# Patient Record
Sex: FEMALE | Race: WHITE | NOT HISPANIC OR LATINO | URBAN - METROPOLITAN AREA
[De-identification: names, ages, dates, MRNs, and addresses within clinical notes are randomized per-mention and may not be internally consistent; named-entity substitution may affect disease eponyms.]

---

## 2022-12-20 ENCOUNTER — EMERGENCY (EMERGENCY)
Facility: HOSPITAL | Age: 52
LOS: 1 days | Discharge: HOME | End: 2022-12-20
Attending: EMERGENCY MEDICINE
Payer: COMMERCIAL

## 2022-12-20 VITALS
WEIGHT: 154.98 LBS | HEART RATE: 65 BPM | HEIGHT: 66 IN | RESPIRATION RATE: 16 BRPM | SYSTOLIC BLOOD PRESSURE: 148 MMHG | DIASTOLIC BLOOD PRESSURE: 90 MMHG | OXYGEN SATURATION: 100 % | TEMPERATURE: 97 F

## 2022-12-20 LAB
ALBUMIN SERPL ELPH-MCNC: 4.2 G/DL — SIGNIFICANT CHANGE UP (ref 3.3–5)
ALP SERPL-CCNC: 60 U/L — SIGNIFICANT CHANGE UP (ref 40–120)
ALT FLD-CCNC: 23 U/L — SIGNIFICANT CHANGE UP (ref 10–45)
ANION GAP SERPL CALC-SCNC: 13 MMOL/L — SIGNIFICANT CHANGE UP (ref 5–17)
APTT BLD: 33.2 SEC — SIGNIFICANT CHANGE UP (ref 27.5–35.5)
AST SERPL-CCNC: 14 U/L — SIGNIFICANT CHANGE UP (ref 10–40)
BASOPHILS # BLD AUTO: 0.03 K/UL — SIGNIFICANT CHANGE UP (ref 0–0.2)
BASOPHILS NFR BLD AUTO: 0.4 % — SIGNIFICANT CHANGE UP (ref 0–2)
BILIRUB SERPL-MCNC: 0.2 MG/DL — SIGNIFICANT CHANGE UP (ref 0.2–1.2)
BUN SERPL-MCNC: 7 MG/DL — SIGNIFICANT CHANGE UP (ref 7–23)
CALCIUM SERPL-MCNC: 9.3 MG/DL — SIGNIFICANT CHANGE UP (ref 8.4–10.5)
CHLORIDE SERPL-SCNC: 105 MMOL/L — SIGNIFICANT CHANGE UP (ref 96–108)
CO2 SERPL-SCNC: 23 MMOL/L — SIGNIFICANT CHANGE UP (ref 22–31)
CREAT SERPL-MCNC: 0.63 MG/DL — SIGNIFICANT CHANGE UP (ref 0.5–1.3)
EGFR: 106 ML/MIN/1.73M2 — SIGNIFICANT CHANGE UP
EOSINOPHIL # BLD AUTO: 0.02 K/UL — SIGNIFICANT CHANGE UP (ref 0–0.5)
EOSINOPHIL NFR BLD AUTO: 0.3 % — SIGNIFICANT CHANGE UP (ref 0–6)
GLUCOSE SERPL-MCNC: 94 MG/DL — SIGNIFICANT CHANGE UP (ref 70–99)
HCG SERPL-ACNC: <2 MIU/ML — SIGNIFICANT CHANGE UP
HCT VFR BLD CALC: 44.8 % — SIGNIFICANT CHANGE UP (ref 34.5–45)
HGB BLD-MCNC: 14.5 G/DL — SIGNIFICANT CHANGE UP (ref 11.5–15.5)
IMM GRANULOCYTES NFR BLD AUTO: 0.4 % — SIGNIFICANT CHANGE UP (ref 0–0.9)
INR BLD: 0.96 RATIO — SIGNIFICANT CHANGE UP (ref 0.88–1.16)
LACTATE SERPL-SCNC: 2 MMOL/L — SIGNIFICANT CHANGE UP (ref 0.5–2)
LIDOCAIN IGE QN: 37 U/L — SIGNIFICANT CHANGE UP (ref 7–60)
LYMPHOCYTES # BLD AUTO: 1.96 K/UL — SIGNIFICANT CHANGE UP (ref 1–3.3)
LYMPHOCYTES # BLD AUTO: 26.3 % — SIGNIFICANT CHANGE UP (ref 13–44)
MCHC RBC-ENTMCNC: 30.9 PG — SIGNIFICANT CHANGE UP (ref 27–34)
MCHC RBC-ENTMCNC: 32.4 GM/DL — SIGNIFICANT CHANGE UP (ref 32–36)
MCV RBC AUTO: 95.3 FL — SIGNIFICANT CHANGE UP (ref 80–100)
MONOCYTES # BLD AUTO: 0.63 K/UL — SIGNIFICANT CHANGE UP (ref 0–0.9)
MONOCYTES NFR BLD AUTO: 8.4 % — SIGNIFICANT CHANGE UP (ref 2–14)
NEUTROPHILS # BLD AUTO: 4.79 K/UL — SIGNIFICANT CHANGE UP (ref 1.8–7.4)
NEUTROPHILS NFR BLD AUTO: 64.2 % — SIGNIFICANT CHANGE UP (ref 43–77)
NRBC # BLD: 0 /100 WBCS — SIGNIFICANT CHANGE UP (ref 0–0)
PLATELET # BLD AUTO: 258 K/UL — SIGNIFICANT CHANGE UP (ref 150–400)
POTASSIUM SERPL-MCNC: 4.3 MMOL/L — SIGNIFICANT CHANGE UP (ref 3.5–5.3)
POTASSIUM SERPL-SCNC: 4.3 MMOL/L — SIGNIFICANT CHANGE UP (ref 3.5–5.3)
PROT SERPL-MCNC: 7.1 G/DL — SIGNIFICANT CHANGE UP (ref 6–8.3)
PROTHROM AB SERPL-ACNC: 11 SEC — SIGNIFICANT CHANGE UP (ref 10.5–13.4)
RBC # BLD: 4.7 M/UL — SIGNIFICANT CHANGE UP (ref 3.8–5.2)
RBC # FLD: 12.6 % — SIGNIFICANT CHANGE UP (ref 10.3–14.5)
SODIUM SERPL-SCNC: 141 MMOL/L — SIGNIFICANT CHANGE UP (ref 135–145)
TROPONIN T, HIGH SENSITIVITY RESULT: 7 NG/L — SIGNIFICANT CHANGE UP (ref 0–51)
WBC # BLD: 7.46 K/UL — SIGNIFICANT CHANGE UP (ref 3.8–10.5)
WBC # FLD AUTO: 7.46 K/UL — SIGNIFICANT CHANGE UP (ref 3.8–10.5)

## 2022-12-20 PROCEDURE — 74177 CT ABD & PELVIS W/CONTRAST: CPT | Mod: 26,MA

## 2022-12-20 PROCEDURE — 72125 CT NECK SPINE W/O DYE: CPT | Mod: 26,MA

## 2022-12-20 PROCEDURE — G1004: CPT

## 2022-12-20 PROCEDURE — 72128 CT CHEST SPINE W/O DYE: CPT | Mod: 26,MG

## 2022-12-20 PROCEDURE — 99285 EMERGENCY DEPT VISIT HI MDM: CPT

## 2022-12-20 PROCEDURE — 70450 CT HEAD/BRAIN W/O DYE: CPT | Mod: 26,MA

## 2022-12-20 PROCEDURE — 71260 CT THORAX DX C+: CPT | Mod: 26,MA

## 2022-12-20 PROCEDURE — 93010 ELECTROCARDIOGRAM REPORT: CPT

## 2022-12-20 PROCEDURE — 72131 CT LUMBAR SPINE W/O DYE: CPT | Mod: 26,MG

## 2022-12-20 RX ORDER — SODIUM CHLORIDE 9 MG/ML
250 INJECTION INTRAMUSCULAR; INTRAVENOUS; SUBCUTANEOUS ONCE
Refills: 0 | Status: COMPLETED | OUTPATIENT
Start: 2022-12-20 | End: 2022-12-20

## 2022-12-20 RX ORDER — ACETAMINOPHEN 500 MG
650 TABLET ORAL ONCE
Refills: 0 | Status: COMPLETED | OUTPATIENT
Start: 2022-12-20 | End: 2022-12-20

## 2022-12-20 NOTE — ED PROVIDER NOTE - PATIENT PORTAL LINK FT
oral
You can access the FollowMyHealth Patient Portal offered by [facility] by registering at the following website: [email or web address]. By joining [facility]’s FollowMyHealth portal, you will also be able to view your health information using other applications (apps) compatible with our system.
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
## 2022-12-20 NOTE — ED PROVIDER NOTE - CARE PROVIDER_API CALL
Bannazadeh, Mohsen (MD)  Surgery; Vascular Surgery  [address] [place] [address]  Phone: [phone]  Fax: [phone]  Follow Up Time: 1-3 Days

## 2022-12-20 NOTE — ED PROVIDER NOTE - PROGRESS NOTE DETAILS
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 Follow-up imaging is recommended to assess for stability in the short term given recent trauma, and at 6 month interval" Additional incidental finding of Right thyroid indeterminate 1.5 cm nodule. Discussed results with patient. pt reports she is aware of thyroid nodule and is agreeable to f/up. Pending vascular consult regarding renal artery finding   Fiordaliza Mahoney PA-C Attending MD Blank: Patient re-evaluated and no acute issues at  this time.  Lab and radiology tests reviewed with patient and .  Seen by vascular, no acute vascular intervention.  Patient to follow with Dr. Belle for possible interval imaging.  Will discharge with C collar in place and with spine.  Patient stable for discharge. Follow up instructions given, importance of follow up emphasized, return to ED parameters reviewed and patient verbalized understanding.  All questions answered, all concerns addressed.

## 2022-12-20 NOTE — ED PROVIDER NOTE - PHYSICAL EXAMINATION
CONSTITUTIONAL: Patient is awake, alert and oriented x 3. Patient is well appearing and in no acute distress  HEAD: NCAT  EYES: PERRL b/l, EOMI  ENT: Airway patent, Nasal mucosa clear. Mouth with normal mucosa. Throat has no vesicles, no oropharyngeal exudates and uvula is midline  NECK: C-collar in place   LUNGS: CTA b/l, no wheezing or rales. + Anterior chest wall ttp    HEART: RRR.+S1S2 no murmurs  ABDOMEN: Soft, non-distended, nttp, no rebound or guarding. No seatbelt sign   EXTREMITY: no edema or calf tenderness b/l, FROM upper and lower ext b/l. + Cervical, thoracic and lumbar ttp  SKIN: with no rash or lesions  NEURO: Cn3-12 grossly intact. Strength 5/5 UE/LE b/l .Nml gross sensation UE/LE b/l. Gait normal. No pronator drift. Neg Romberg. Normal finger to nose CONSTITUTIONAL: Patient is awake, alert and oriented x 3. Patient is well appearing and in no acute distress  HEAD: NCAT  EYES: PERRL b/l, EOMI  ENT: Airway patent, Nasal mucosa clear. Mouth with normal mucosa. Throat has no vesicles, no oropharyngeal exudates and uvula is midline  NECK: C-collar in place   LUNGS: CTA b/l, no wheezing or rales. + Anterior chest wall ttp    HEART: RRR.+S1S2 no murmurs  ABDOMEN: Soft, non-distended, nttp, no rebound or guarding. No seatbelt sign   EXTREMITY: no edema or calf tenderness b/l, FROM upper and lower ext b/l. + Cervical, thoracic and lumbar ttp  SKIN: with no rash or lesions  NEURO: Cn3-12 grossly intact. Strength 5/5 UE/LE b/l .Nml gross sensation UE/LE b/l
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
## 2022-12-20 NOTE — ED PROVIDER NOTE - NSFOLLOWUPINSTRUCTIONS_ED_ALL_ED_FT
1. Please follow up with your Primary Care Doctor after discharge, bring a copy of your results to follow up appointment     ** Please discuss incidental finding of a thyroid nodule on your CT scan with recommendation for Thyroid US for further evaluation **    2. Please rest, stay hydrated. For continued or recurrent pain recommend taking over the counter Tylenol (acetaminophen) 1000mg every 6-8 hours as needed and/or over the counter Motrin (Ibuprofen/Advil) 400-600mg every 6 hours as needed    3. Follow up with Vascular Surgery after discharge for reevaluation and continued management. Please see office information below to call and schedule appointment:       Vascular Surgery    [address] [place] [address]    Phone: [phone]    Fax: [phone]    4. Follow up with Neurosurgery-Spine after discharge for reevaluation and continued management for continued back or neck pain. Please see office information below to call and schedule appointment:       Neurosurgery    [address], [address]    [place] [address]    Phone: [phone]    Fax: [phone]    5. Return to ED for any new or worsened symptoms of concern 1. Please follow up with your Primary Care Doctor after discharge, bring a copy of your results to follow up appointment     ** Please discuss incidental finding of a thyroid nodule on your CT scan with recommendation for Thyroid US for further evaluation **    2. Please rest, stay hydrated. For continued or recurrent pain recommend taking over the counter Tylenol (acetaminophen) 1000mg every 6-8 hours as needed and/or over the counter Motrin (Ibuprofen/Advil) 400-600mg every 6 hours as needed    3. Follow up with Vascular Surgery after discharge for reevaluation and continued management. Please see Dr. Belle's information below to call and schedule appointment:       Vascular Surgery    [address] [place] [address]    Phone: [phone]    Fax: [phone]    4. Follow up with Neurosurgery-Spine after discharge for reevaluation and continued management for continued back or neck pain. Please see office information below to call and schedule appointment:       Neurosurgery    [address] [facility], [address]    [place] [address]    Phone: [phone]    Fax: [phone]    PLEASE KEEP YOUR CERVICAL COLLAR ON UNTIL YOU ARE CLEARED BY THE SPINE TEAM.    5. Return to ED for any new or worsened symptoms of concern including numbness, weakness or tingling in extremities, loss of urinary or bowel continence, change in vision, double vision, sudden loss of vision, severe headache, chest pain, shortness of breath, severe abdominal pain, persistent vomiting, difficulty urinating or any other concerns.

## 2022-12-21 VITALS
OXYGEN SATURATION: 98 % | TEMPERATURE: 98 F | HEART RATE: 56 BPM | DIASTOLIC BLOOD PRESSURE: 76 MMHG | RESPIRATION RATE: 15 BRPM | SYSTOLIC BLOOD PRESSURE: 108 MMHG

## 2022-12-21 PROBLEM — Z00.00 ENCOUNTER FOR PREVENTIVE HEALTH EXAMINATION: Status: ACTIVE | Noted: 2022-12-21

## 2022-12-21 PROCEDURE — 80053 COMPREHEN METABOLIC PANEL: CPT

## 2022-12-21 PROCEDURE — 84484 ASSAY OF TROPONIN QUANT: CPT

## 2022-12-21 PROCEDURE — 70450 CT HEAD/BRAIN W/O DYE: CPT | Mod: MA

## 2022-12-21 PROCEDURE — 36415 COLL VENOUS BLD VENIPUNCTURE: CPT

## 2022-12-21 PROCEDURE — 93005 ELECTROCARDIOGRAM TRACING: CPT

## 2022-12-21 PROCEDURE — 85730 THROMBOPLASTIN TIME PARTIAL: CPT

## 2022-12-21 PROCEDURE — 84702 CHORIONIC GONADOTROPIN TEST: CPT

## 2022-12-21 PROCEDURE — G1004: CPT

## 2022-12-21 PROCEDURE — 74177 CT ABD & PELVIS W/CONTRAST: CPT | Mod: MA

## 2022-12-21 PROCEDURE — 85025 COMPLETE CBC W/AUTO DIFF WBC: CPT

## 2022-12-21 PROCEDURE — 99285 EMERGENCY DEPT VISIT HI MDM: CPT | Mod: 25

## 2022-12-21 PROCEDURE — 72125 CT NECK SPINE W/O DYE: CPT | Mod: MA

## 2022-12-21 PROCEDURE — 83605 ASSAY OF LACTIC ACID: CPT

## 2022-12-21 PROCEDURE — 83690 ASSAY OF LIPASE: CPT

## 2022-12-21 PROCEDURE — 85610 PROTHROMBIN TIME: CPT

## 2022-12-21 PROCEDURE — 71260 CT THORAX DX C+: CPT | Mod: MA

## 2022-12-21 RX ADMIN — Medication 650 MILLIGRAM(S): at 00:44
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
## 2022-12-22 ENCOUNTER — NON-APPOINTMENT (OUTPATIENT)
Age: 52
End: 2022-12-22

## 2022-12-23 ENCOUNTER — APPOINTMENT (OUTPATIENT)
Dept: ORTHOPEDIC SURGERY | Facility: CLINIC | Age: 52
End: 2022-12-23

## 2022-12-23 ENCOUNTER — TRANSCRIPTION ENCOUNTER (OUTPATIENT)
Age: 52
End: 2022-12-23

## 2022-12-23 PROCEDURE — 99203 OFFICE O/P NEW LOW 30 MIN: CPT

## 2022-12-23 PROCEDURE — 99072 ADDL SUPL MATRL&STAF TM PHE: CPT

## 2022-12-23 NOTE — PHYSICAL EXAM
[Antalgic] : not antalgic [Ataxic] : not ataxic [de-identified] : She is in the cervical collar.  Full range of motion bilateral shoulders without evidence of impingement. No instability of bilateral upper extremities.  Cranial nerves II through XII grossly intact. Intact sensation bilateral upper extremities. 5/5 deltoids biceps triceps wrist extensors wrist flexors finger flexors and hand intrinsics. 1+ biceps triceps and brachioradialis reflexes. Negative Mcmullen's. 2+ radial pulse. Negative Tinel's over the cubital and carpal tunnel. No skin lesions on the right and left upper extremities. [de-identified] : Review of her CTs of her cervical, thoracic, lumbar spine does not reveal any obvious fractures or dislocations.
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
## 2023-01-04 PROBLEM — M54.12 CERVICAL RADICULOPATHY: Status: ACTIVE | Noted: 2022-12-23

## 2023-01-05 ENCOUNTER — NON-APPOINTMENT (OUTPATIENT)
Age: 53
End: 2023-01-05

## 2023-01-06 ENCOUNTER — NON-APPOINTMENT (OUTPATIENT)
Age: 53
End: 2023-01-06

## 2023-01-06 ENCOUNTER — APPOINTMENT (OUTPATIENT)
Dept: NEUROSURGERY | Facility: CLINIC | Age: 53
End: 2023-01-06
Payer: COMMERCIAL

## 2023-01-06 VITALS
DIASTOLIC BLOOD PRESSURE: 68 MMHG | SYSTOLIC BLOOD PRESSURE: 108 MMHG | OXYGEN SATURATION: 100 % | WEIGHT: 145 LBS | HEIGHT: 67 IN | TEMPERATURE: 97.3 F | BODY MASS INDEX: 22.76 KG/M2 | HEART RATE: 58 BPM

## 2023-01-06 DIAGNOSIS — M54.12 RADICULOPATHY, CERVICAL REGION: ICD-10-CM

## 2023-01-06 PROCEDURE — 99072 ADDL SUPL MATRL&STAF TM PHE: CPT

## 2023-01-06 PROCEDURE — 99204 OFFICE O/P NEW MOD 45 MIN: CPT

## 2023-01-06 RX ORDER — METHYLPREDNISOLONE 4 MG/1
4 TABLET ORAL
Qty: 1 | Refills: 0 | Status: DISCONTINUED | COMMUNITY
Start: 2022-12-23 | End: 2023-01-06

## 2023-01-11 ENCOUNTER — APPOINTMENT (OUTPATIENT)
Dept: MRI IMAGING | Facility: CLINIC | Age: 53
End: 2023-01-11
Payer: COMMERCIAL

## 2023-01-11 ENCOUNTER — OUTPATIENT (OUTPATIENT)
Dept: OUTPATIENT SERVICES | Facility: HOSPITAL | Age: 53
LOS: 1 days | End: 2023-01-11
Payer: COMMERCIAL

## 2023-01-11 DIAGNOSIS — Z00.00 ENCOUNTER FOR GENERAL ADULT MEDICAL EXAMINATION WITHOUT ABNORMAL FINDINGS: ICD-10-CM

## 2023-01-11 PROCEDURE — 72141 MRI NECK SPINE W/O DYE: CPT

## 2023-01-11 PROCEDURE — 72141 MRI NECK SPINE W/O DYE: CPT | Mod: 26

## 2023-01-12 ENCOUNTER — APPOINTMENT (OUTPATIENT)
Dept: VASCULAR SURGERY | Facility: CLINIC | Age: 53
End: 2023-01-12
Payer: COMMERCIAL

## 2023-01-12 VITALS
TEMPERATURE: 97.8 F | SYSTOLIC BLOOD PRESSURE: 109 MMHG | WEIGHT: 145 LBS | HEIGHT: 67 IN | HEART RATE: 59 BPM | BODY MASS INDEX: 22.76 KG/M2 | DIASTOLIC BLOOD PRESSURE: 74 MMHG

## 2023-01-12 PROCEDURE — 99213 OFFICE O/P EST LOW 20 MIN: CPT

## 2023-01-12 PROCEDURE — 99072 ADDL SUPL MATRL&STAF TM PHE: CPT

## 2023-01-12 NOTE — PHYSICAL EXAM
[de-identified] : Alert and oriented [de-identified] : C-collar in place, cranial nerves grossly intact [de-identified] : Clear on auscultation bilaterally [de-identified] : Regular rate and rhythm [FreeTextEntry1] : Palpable bilateral femoral and radial pulses\par Abdomen soft nontender not distended\par Bilateral lower extremity well perfused
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
## 2023-01-12 NOTE — ASSESSMENT
[FreeTextEntry1] : 52-year-old female with incidental finding of 1 cm left renal artery aneurysm\par \par Plan\par \par Follow-up in 6 months with renal artery duplex\par Discussed with the patient to stop smoking

## 2023-01-13 ENCOUNTER — RESULT REVIEW (OUTPATIENT)
Age: 53
End: 2023-01-13

## 2023-01-14 ENCOUNTER — APPOINTMENT (OUTPATIENT)
Dept: RADIOLOGY | Facility: IMAGING CENTER | Age: 53
End: 2023-01-14

## 2023-01-15 ENCOUNTER — APPOINTMENT (OUTPATIENT)
Dept: RADIOLOGY | Facility: IMAGING CENTER | Age: 53
End: 2023-01-15
Payer: COMMERCIAL

## 2023-01-15 ENCOUNTER — OUTPATIENT (OUTPATIENT)
Dept: OUTPATIENT SERVICES | Facility: HOSPITAL | Age: 53
LOS: 1 days | End: 2023-01-15
Payer: COMMERCIAL

## 2023-01-15 DIAGNOSIS — M54.12 RADICULOPATHY, CERVICAL REGION: ICD-10-CM

## 2023-01-15 PROCEDURE — 72052 X-RAY EXAM NECK SPINE 6/>VWS: CPT | Mod: 26

## 2023-01-15 PROCEDURE — 72052 X-RAY EXAM NECK SPINE 6/>VWS: CPT
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
## 2023-01-15 NOTE — PHYSICAL EXAM
[General Appearance - Alert] : alert [General Appearance - In No Acute Distress] : in no acute distress [General Appearance - Well Nourished] : well nourished [General Appearance - Well Developed] : well developed [Oriented To Time, Place, And Person] : oriented to person, place, and time [Impaired Insight] : insight and judgment were intact [Affect] : the affect was normal [Motor Tone] : muscle tone was normal in all four extremities [Motor Strength] : muscle strength was normal in all four extremities [Sensation Tactile Decrease] : light touch was intact [Balance] : balance was intact [2+] : Brachioradialis left 2+ [1+] : Patella left 1+ [Sclera] : the sclera and conjunctiva were normal [PERRL With Normal Accommodation] : pupils were equal in size, round, reactive to light, with normal accommodation [Extraocular Movements] : extraocular movements were intact [Full Visual Field] : full visual field [Outer Ear] : the ears and nose were normal in appearance [Neck Appearance] : the appearance of the neck was normal [Neck Cervical Mass (___cm)] : no neck mass was observed [] : no respiratory distress [Exaggerated Use Of Accessory Muscles For Inspiration] : no accessory muscle use [Heart Rate And Rhythm] : heart rate was normal and rhythm regular [Edema] : there was no peripheral edema [Abdomen Soft] : soft [No Spinal Tenderness] : no spinal tenderness [Abnormal Walk] : normal gait [Involuntary Movements] : no involuntary movements were seen [Skin Turgor] : normal skin turgor [FreeTextEntry1] : C-collar in place; with neck pain with nec flextion [Limited Balance] : balance was intact [Tremor] : no tremor present
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
## 2023-01-25 ENCOUNTER — APPOINTMENT (OUTPATIENT)
Dept: ORTHOPEDIC SURGERY | Facility: CLINIC | Age: 53
End: 2023-01-25

## 2023-07-13 ENCOUNTER — APPOINTMENT (OUTPATIENT)
Dept: VASCULAR SURGERY | Facility: CLINIC | Age: 53
End: 2023-07-13
Payer: COMMERCIAL

## 2023-07-13 VITALS
HEIGHT: 67 IN | BODY MASS INDEX: 23.23 KG/M2 | SYSTOLIC BLOOD PRESSURE: 122 MMHG | TEMPERATURE: 98.3 F | HEART RATE: 61 BPM | DIASTOLIC BLOOD PRESSURE: 79 MMHG | WEIGHT: 148 LBS

## 2023-07-13 DIAGNOSIS — I72.2 ANEURYSM OF RENAL ARTERY: ICD-10-CM

## 2023-07-13 PROCEDURE — 99214 OFFICE O/P EST MOD 30 MIN: CPT

## 2023-07-13 PROCEDURE — 93975 VASCULAR STUDY: CPT
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
## 2023-07-13 NOTE — PHYSICAL EXAM
[de-identified] : Alert and oriented [de-identified] : C-collar in place, cranial nerves grossly intact [de-identified] : Clear on auscultation bilaterally [de-identified] : Regular rate and rhythm [FreeTextEntry1] : Palpable bilateral femoral and radial pulses\par Abdomen soft nontender not distended\par Bilateral lower extremity well perfused

## 2023-07-13 NOTE — DATA REVIEWED
[FreeTextEntry1] : CT with IV contrast was reviewed and showed 1 cm left renal artery aneurysm\par \par 7/13/2023: Duplex examination of left renal artery showed 1 cm left renal artery aneurysm

## 2023-07-13 NOTE — ASSESSMENT
[FreeTextEntry1] : 53-year-old female with incidental finding of 1 cm left renal artery aneurysm\par \par Plan\par \par Follow-up in 12  months with renal artery duplex\par Discussed with the patient to stop smoking

## 2023-10-06 ENCOUNTER — APPOINTMENT (OUTPATIENT)
Dept: NEUROSURGERY | Facility: CLINIC | Age: 53
End: 2023-10-06

## 2024-01-09 ENCOUNTER — APPOINTMENT (OUTPATIENT)
Dept: MRI IMAGING | Facility: CLINIC | Age: 54
End: 2024-01-09
Payer: COMMERCIAL

## 2024-01-09 ENCOUNTER — OUTPATIENT (OUTPATIENT)
Dept: OUTPATIENT SERVICES | Facility: HOSPITAL | Age: 54
LOS: 1 days | End: 2024-01-09
Payer: COMMERCIAL

## 2024-01-09 ENCOUNTER — APPOINTMENT (OUTPATIENT)
Dept: ULTRASOUND IMAGING | Facility: CLINIC | Age: 54
End: 2024-01-09
Payer: COMMERCIAL

## 2024-01-09 DIAGNOSIS — H93.A1 PULSATILE TINNITUS, RIGHT EAR: ICD-10-CM

## 2024-01-09 PROCEDURE — 70544 MR ANGIOGRAPHY HEAD W/O DYE: CPT

## 2024-01-09 PROCEDURE — 70553 MRI BRAIN STEM W/O & W/DYE: CPT

## 2024-01-09 PROCEDURE — 70553 MRI BRAIN STEM W/O & W/DYE: CPT | Mod: 26

## 2024-01-09 PROCEDURE — 93880 EXTRACRANIAL BILAT STUDY: CPT | Mod: 26

## 2024-01-09 PROCEDURE — 70544 MR ANGIOGRAPHY HEAD W/O DYE: CPT | Mod: 26,76,59

## 2024-01-09 PROCEDURE — 70544 MR ANGIOGRAPHY HEAD W/O DYE: CPT | Mod: 26,59

## 2024-01-09 PROCEDURE — 93880 EXTRACRANIAL BILAT STUDY: CPT

## 2024-01-09 PROCEDURE — A9585: CPT

## 2024-07-18 ENCOUNTER — APPOINTMENT (OUTPATIENT)
Dept: VASCULAR SURGERY | Facility: CLINIC | Age: 54
End: 2024-07-18
Payer: COMMERCIAL

## 2024-07-18 VITALS
BODY MASS INDEX: 23.86 KG/M2 | HEART RATE: 56 BPM | TEMPERATURE: 97.6 F | WEIGHT: 152 LBS | HEIGHT: 67 IN | SYSTOLIC BLOOD PRESSURE: 126 MMHG | DIASTOLIC BLOOD PRESSURE: 71 MMHG

## 2024-07-18 DIAGNOSIS — I72.2 ANEURYSM OF RENAL ARTERY: ICD-10-CM

## 2024-07-18 PROCEDURE — 93975 VASCULAR STUDY: CPT

## 2024-07-18 PROCEDURE — 99213 OFFICE O/P EST LOW 20 MIN: CPT

## 2025-07-24 ENCOUNTER — APPOINTMENT (OUTPATIENT)
Dept: VASCULAR SURGERY | Facility: CLINIC | Age: 55
End: 2025-07-24
Payer: COMMERCIAL

## 2025-07-24 ENCOUNTER — NON-APPOINTMENT (OUTPATIENT)
Age: 55
End: 2025-07-24

## 2025-07-24 VITALS
WEIGHT: 155 LBS | BODY MASS INDEX: 24.33 KG/M2 | SYSTOLIC BLOOD PRESSURE: 120 MMHG | HEIGHT: 67 IN | DIASTOLIC BLOOD PRESSURE: 83 MMHG | TEMPERATURE: 97.9 F | HEART RATE: 61 BPM

## 2025-07-24 DIAGNOSIS — I72.2 ANEURYSM OF RENAL ARTERY: ICD-10-CM

## 2025-07-24 PROCEDURE — 99214 OFFICE O/P EST MOD 30 MIN: CPT

## 2025-07-24 PROCEDURE — 93975 VASCULAR STUDY: CPT

## 2025-08-28 ENCOUNTER — APPOINTMENT (OUTPATIENT)
Dept: INTERNAL MEDICINE | Facility: CLINIC | Age: 55
End: 2025-08-28
Payer: COMMERCIAL

## 2025-08-28 ENCOUNTER — NON-APPOINTMENT (OUTPATIENT)
Age: 55
End: 2025-08-28

## 2025-08-28 VITALS
DIASTOLIC BLOOD PRESSURE: 80 MMHG | HEIGHT: 67 IN | SYSTOLIC BLOOD PRESSURE: 122 MMHG | BODY MASS INDEX: 24.33 KG/M2 | WEIGHT: 155 LBS | HEART RATE: 70 BPM | TEMPERATURE: 97.2 F | RESPIRATION RATE: 12 BRPM | OXYGEN SATURATION: 99 %

## 2025-08-28 DIAGNOSIS — Z12.11 ENCOUNTER FOR SCREENING FOR MALIGNANT NEOPLASM OF COLON: ICD-10-CM

## 2025-08-28 DIAGNOSIS — Z12.12 ENCOUNTER FOR SCREENING FOR MALIGNANT NEOPLASM OF COLON: ICD-10-CM

## 2025-08-28 DIAGNOSIS — Z82.3 FAMILY HISTORY OF STROKE: ICD-10-CM

## 2025-08-28 DIAGNOSIS — Z83.3 FAMILY HISTORY OF DIABETES MELLITUS: ICD-10-CM

## 2025-08-28 DIAGNOSIS — Z84.1 FAMILY HISTORY OF DISORDERS OF KIDNEY AND URETER: ICD-10-CM

## 2025-08-28 DIAGNOSIS — Z83.511 FAMILY HISTORY OF GLAUCOMA: ICD-10-CM

## 2025-08-28 DIAGNOSIS — Z00.00 ENCOUNTER FOR GENERAL ADULT MEDICAL EXAMINATION W/OUT ABNORMAL FINDINGS: ICD-10-CM

## 2025-08-28 DIAGNOSIS — E04.1 NONTOXIC SINGLE THYROID NODULE: ICD-10-CM

## 2025-08-28 DIAGNOSIS — Z12.39 ENCOUNTER FOR OTHER SCREENING FOR MALIGNANT NEOPLASM OF BREAST: ICD-10-CM

## 2025-08-28 DIAGNOSIS — L98.9 DISORDER OF THE SKIN AND SUBCUTANEOUS TISSUE, UNSPECIFIED: ICD-10-CM
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
## 2025-08-28 DIAGNOSIS — M19.041 PRIMARY OSTEOARTHRITIS, RIGHT HAND: ICD-10-CM

## 2025-08-28 PROCEDURE — 99386 PREV VISIT NEW AGE 40-64: CPT

## 2025-08-28 RX ORDER — ESTRADIOL 0.07 MG/D
0.07 PATCH TRANSDERMAL
Refills: 0 | Status: ACTIVE | COMMUNITY

## 2025-09-05 DIAGNOSIS — R79.89 OTHER SPECIFIED ABNORMAL FINDINGS OF BLOOD CHEMISTRY: ICD-10-CM

## 2025-09-05 LAB
25(OH)D3 SERPL-MCNC: 20.9 NG/ML
ALBUMIN SERPL ELPH-MCNC: 4.6 G/DL
ALP BLD-CCNC: 54 U/L
ALT SERPL-CCNC: 29 U/L
ANION GAP SERPL CALC-SCNC: 13 MMOL/L
APPEARANCE: CLEAR
AST SERPL-CCNC: 12 U/L
BASOPHILS # BLD AUTO: 0.05 K/UL
BASOPHILS NFR BLD AUTO: 1 %
BILIRUB SERPL-MCNC: 0.4 MG/DL
BILIRUBIN URINE: NEGATIVE
BLOOD URINE: NEGATIVE
BUN SERPL-MCNC: 8 MG/DL
CALCIUM SERPL-MCNC: 9.6 MG/DL
CHLORIDE SERPL-SCNC: 105 MMOL/L
CHOLEST SERPL-MCNC: 262 MG/DL
CO2 SERPL-SCNC: 23 MMOL/L
COLOR: YELLOW
CREAT SERPL-MCNC: 0.72 MG/DL
EGFRCR SERPLBLD CKD-EPI 2021: 98 ML/MIN/1.73M2
EOSINOPHIL # BLD AUTO: 0.03 K/UL
EOSINOPHIL NFR BLD AUTO: 0.6 %
ESTIMATED AVERAGE GLUCOSE: 111 MG/DL
GLUCOSE QUALITATIVE U: NEGATIVE MG/DL
GLUCOSE SERPL-MCNC: 81 MG/DL
HBA1C MFR BLD HPLC: 5.5 %
HCT VFR BLD CALC: 43.8 %
HDLC SERPL-MCNC: 77 MG/DL
HGB BLD-MCNC: 14.3 G/DL
IMM GRANULOCYTES NFR BLD AUTO: 0.2 %
KETONES URINE: NEGATIVE MG/DL
LDLC SERPL-MCNC: 165 MG/DL
LEUKOCYTE ESTERASE URINE: NEGATIVE
LYMPHOCYTES # BLD AUTO: 1.42 K/UL
LYMPHOCYTES NFR BLD AUTO: 29.5 %
M TB IFN-G BLD-IMP: NEGATIVE
MAN DIFF?: NORMAL
MCHC RBC-ENTMCNC: 31.6 PG
MCHC RBC-ENTMCNC: 32.6 G/DL
MCV RBC AUTO: 96.7 FL
MONOCYTES # BLD AUTO: 0.47 K/UL
MONOCYTES NFR BLD AUTO: 9.8 %
NEUTROPHILS # BLD AUTO: 2.84 K/UL
NEUTROPHILS NFR BLD AUTO: 58.9 %
NITRITE URINE: NEGATIVE
NONHDLC SERPL-MCNC: 184 MG/DL
PH URINE: 7
PLATELET # BLD AUTO: 261 K/UL
POTASSIUM SERPL-SCNC: 4.6 MMOL/L
PROT SERPL-MCNC: 6.7 G/DL
PROTEIN URINE: NEGATIVE MG/DL
QUANTIFERON TB PLUS MITOGEN MINUS NIL: >10 IU/ML
QUANTIFERON TB PLUS NIL: 0.04 IU/ML
QUANTIFERON TB PLUS TB1 MINUS NIL: 0 IU/ML
QUANTIFERON TB PLUS TB2 MINUS NIL: 0.02 IU/ML
RBC # BLD: 4.53 M/UL
RBC # FLD: 13.5 %
SODIUM SERPL-SCNC: 141 MMOL/L
SPECIFIC GRAVITY URINE: <1.005
TRIGL SERPL-MCNC: 115 MG/DL
TSH SERPL-ACNC: 2.37 UIU/ML
UROBILINOGEN URINE: 0.2 MG/DL
VIT B12 SERPL-MCNC: 286 PG/ML
WBC # FLD AUTO: 4.82 K/UL